# Patient Record
Sex: MALE | Race: WHITE | NOT HISPANIC OR LATINO | Employment: STUDENT | URBAN - METROPOLITAN AREA
[De-identification: names, ages, dates, MRNs, and addresses within clinical notes are randomized per-mention and may not be internally consistent; named-entity substitution may affect disease eponyms.]

---
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
## 2025-02-04 NOTE — NURSING NOTE
Pt appears to slept throughout the night. No distress noted. Safety precaution maintained. Review of Systems Health Update:   What is your biggest concern for today's visit? cpe    GENERAL / CONSTITUTIONAL:  []  YES    [x]  NO   Excessive fatigue.  []  YES    [x]  NO   Unexplained weight loss   [x]  YES    []  NO   Have you traveled outside of the [place] in the past year?   If so, where: [place]    EARS, NOSE, MOUTH AND THROAT:  []  YES    [x]  NO   Hoarseness or voice change.  []  YES    [x]  NO   Difficult or painful swallowing.    HEART:  []  YES    [x]  NO   Chest pain  []  YES    [x]  NO   Palpitation or irregular heart beat.    LUNGS:   []  YES    [x]  NO   Coughing up blood.   []  YES    [x]  NO   Chronic cough.  []  YES    [x]  NO   Wheezing.  []  YES    [x]  NO   Shortness of Breath    INTESTINAL SYSTEM:  []  YES    [x]  NO   Tarry (black) stools.  []  YES    [x]  NO   Recurrent abdominal pain.  []  YES    [x]  NO   Frequent nausea or vomiting.    URINARY SYSTEM:   []  YES    [x]  NO   Difficult or painful urination.  []  YES    [x]  NO   Urination more than once a night.  []  YES    [x]  NO   Bloody Urine    SKELETON AND JOINTS:  []  YES    [x]  NO   Swollen or painful joints.   []  YES    [x]  NO   Gout.   Which joints:     SKIN:  []  YES    [x]  NO   Recurrent skin rash.   []  YES    [x]  NO   Moles that have changed in size or color.    NERVOUS SYSTEM:   []  YES    [x]  NO   Frequent or severe headaches.  []  YES    [x]  NO   Loss of consciousness/concussion.  []  YES    [x]  NO   Weakness or recurrent numbness or tingling in your arms or legs.    PSYCHIATRIC:  Depression Screening  Over the last two weeks, how often have you been bothered by any of the following problems?  []  YES    [x]  NO   Little interest or pleasure in doing things.    []  YES    [x]  NO   Feeling down, depressed or hopeless.   []  YES    [x]  NO   Feeling nervous, anxious or on edge.  []  YES    [x]  NO   Not being able to stop or control worrying.     ENDOCRINE:  []  YES    [x]  NO   History of diabetes.  []  YES    [x]   NO   History of thyroid disease.     HEMATOLOGIC:   []  YES    [x]  NO   Swollen glands or lymph nodes.  []  YES    [x]  NO   History of anemia.   []  YES    [x]  NO   History of blood clots.    IMMUNE SYSTEM:  []  YES    [x]  NO   History of AIDS.  []  YES    [x]  NO   Asthma.    FEMALE HEALTH UPDATE:  []  YES    [x]  NO   Any problems with irregular menstrual periods, painful periods or spotting.  Approximate date of last menstrual period:  N/a   How far apart are your periods:  N/a   How many days do you flow?  N/a   Amount of flow:  Scant []     Moderate  []    Heavy  []   Number of pregnancies:  2  Number of living children:  2  []  YES    [x]  NO   Are you trying to get pregnant?   []  YES    [x]  NO   Do you use birth control (including tubal or partner with vasectomy)?   If yes, what type:    []  YES    [x]  NO   Have you had an abnormal cervical pap smear?  []  YES    [x]  NO   Have you had a hysterectomy?    LIFESTYLE HABITS:    []  YES    [x]  NO   Do you drink alcohol?   Quantity consumed per week on average: Drinks 3  []  YES    [x]  NO   In the past year have you ever drank or used drugs more than you meant to?  []  YES    [x]  NO   Have you felt you wanted or needed to cut down on your drinking or drug use in the past year?  []  YES    [x]  NO   Have you been annoyed by friends or family that suggested you cut back on your drinking or use of drugs?  []  YES    [x]  NO   Have you ever shared hypodermic needles?   []  YES    [x]  NO   Have you used street drugs in the past 2 years?   How many days per week do you exercise for 30 minutes or more: 2-3  []  YES    [x]  NO   Do you smoke?   If you are a former smoker when did you quit? 2014   If you smoke, how many packs per day?    []  YES    [x]  NO   Are you interested in and/or ready to quit smoking?  [x]  YES    []  NO   Do you wear your seatbelt?    []  YES    [x]  NO  []  PAST   Hormonal therapy  []  YES    [x]  NO   Do you have concerns about your  sexual practices that you wish to discuss?  []  YES    [x]  NO   Do you want to be screened for AIDS?     SCREENING FOR INTIMATE PARTNER VIOLENCE:  How often does your partner physically hurt you? 1  How often does your partner insult or talk down to you? 1  How often does your partner threaten you with physical harm? 1  How often does your partner scream at you? 1  [x]  YES    []  NO   Do you currently feel safe in your present living situation?

## 2025-02-13 ENCOUNTER — OFFICE VISIT (OUTPATIENT)
Dept: FAMILY MEDICINE CLINIC | Facility: CLINIC | Age: 20
End: 2025-02-13
Payer: MEDICARE

## 2025-02-13 VITALS
BODY MASS INDEX: 29.92 KG/M2 | OXYGEN SATURATION: 98 % | RESPIRATION RATE: 18 BRPM | DIASTOLIC BLOOD PRESSURE: 88 MMHG | HEIGHT: 66 IN | TEMPERATURE: 98 F | HEART RATE: 100 BPM | SYSTOLIC BLOOD PRESSURE: 130 MMHG | WEIGHT: 186.2 LBS

## 2025-02-13 DIAGNOSIS — F33.2 SEVERE EPISODE OF RECURRENT MAJOR DEPRESSIVE DISORDER, WITHOUT PSYCHOTIC FEATURES (HCC): ICD-10-CM

## 2025-02-13 DIAGNOSIS — Z00.00 ANNUAL PHYSICAL EXAM: Primary | ICD-10-CM

## 2025-02-13 PROCEDURE — 99395 PREV VISIT EST AGE 18-39: CPT | Performed by: PHYSICIAN ASSISTANT

## 2025-02-13 PROCEDURE — 99213 OFFICE O/P EST LOW 20 MIN: CPT | Performed by: PHYSICIAN ASSISTANT

## 2025-02-13 RX ORDER — CHLORAL HYDRATE 500 MG
2000 CAPSULE ORAL DAILY
Qty: 60 CAPSULE | Refills: 6 | Status: SHIPPED | OUTPATIENT
Start: 2025-02-13 | End: 2025-03-15

## 2025-02-13 RX ORDER — QUETIAPINE FUMARATE 50 MG/1
50 TABLET, FILM COATED ORAL
Qty: 30 TABLET | Refills: 6 | Status: SHIPPED | OUTPATIENT
Start: 2025-02-13 | End: 2025-03-15

## 2025-02-13 RX ORDER — ESCITALOPRAM OXALATE 5 MG/1
5 TABLET ORAL DAILY
Qty: 30 TABLET | Refills: 6 | Status: SHIPPED | OUTPATIENT
Start: 2025-02-13 | End: 2025-03-15

## 2025-02-13 NOTE — PROGRESS NOTES
Adult Annual Physical  Name: Brad Mccollum      : 2005      MRN: [id number]  Encounter Provider: Marisa Eisenberg PA-C  Encounter Date: 2025   Encounter department: [facility]    Assessment & Plan  Annual physical exam         Severe episode of recurrent major depressive disorder, without psychotic features (HCC)  Depression Screening Follow-up Plan: Patient's depression screening was positive with a PHQ-9 score of 11. Patient with underlying depression and was advised to continue current medications as prescribed.  Encouraged to schedule with psych, meds as is  Orders:    QUEtiapine (SEROquel) 50 mg tablet; Take 1 tablet (50 mg total) by mouth daily at bedtime    escitalopram (LEXAPRO) 5 mg tablet; Take 1 tablet (5 mg total) by mouth daily    Omega-3 Fatty Acids (fish oil) 1,000 mg; Take 2 capsules (2,000 mg total) by mouth daily    Immunizations and preventive care screenings were discussed with patient today. Appropriate education was printed on patient's after visit summary.    Counseling:  Alcohol/drug use: discussed moderation in alcohol intake, the recommendations for healthy alcohol use, and avoidance of illicit drug use.  Dental Health: discussed importance of regular tooth brushing, flossing, and dental visits.  Injury prevention: discussed safety/seat belts, safety helmets, smoke detectors, carbon monoxide detectors, and smoking near bedding or upholstery.  Exercise: the importance of regular exercise/physical activity was discussed. Recommend exercise 3-5 times per week for at least 30 minutes.          History of Present Illness     Adult Annual Physical:  Patient presents for annual physical.     Diet and Physical Activity:  - Diet/Nutrition: well balanced diet.  - Exercise: no formal exercise.    Depression Screening:    - PHQ-9 Score: 11    General Health:  - Sleep: sleeps poorly.  - Hearing: normal hearing bilateral ears.  - Vision: no  "vision problems.  - Dental: brushes teeth twice daily and no dental visits for > 1 year.     Health:    - Urinary symptoms: none.     Review of Systems   Constitutional: Negative.    HENT: Negative.     Eyes: Negative.    Respiratory: Negative.     Cardiovascular: Negative.    Gastrointestinal: Negative.    Endocrine: Negative.    Genitourinary: Negative.    Musculoskeletal: Negative.    Skin: Negative.    Allergic/Immunologic: Negative.    Neurological: Negative.    Hematological: Negative.    Psychiatric/Behavioral: Negative.       Medical History Reviewed by provider this encounter:  Meds     .    Objective   /88 (Patient Position: Sitting, Cuff Size: Standard)   Pulse 100   Temp 98 °F (36.7 °C) (Temporal)   Resp 18   Ht 5' 6\" (1.676 m)   Wt 84.5 kg (186 lb 3.2 oz)   SpO2 98%   BMI 30.05 kg/m²     Physical Exam  Constitutional:       Appearance: Normal appearance. He is well-developed and normal weight.   HENT:      Head: Normocephalic and atraumatic.      Right Ear: External ear normal.      Left Ear: External ear normal.   Eyes:      Extraocular Movements: Extraocular movements intact.      Conjunctiva/sclera: Conjunctivae normal.      Pupils: Pupils are equal, round, and reactive to light.   Neck:      Thyroid: No thyromegaly.   Cardiovascular:      Rate and Rhythm: Normal rate and regular rhythm.      Pulses: Normal pulses.      Heart sounds: Normal heart sounds. No murmur heard.  Pulmonary:      Effort: Pulmonary effort is normal.      Breath sounds: Normal breath sounds. No wheezing or rales.   Abdominal:      General: Abdomen is flat. Bowel sounds are normal.      Palpations: Abdomen is soft. There is no mass.      Tenderness: There is no abdominal tenderness. There is no rebound.   Musculoskeletal:         General: Normal range of motion.      Cervical back: Normal range of motion and neck supple.   Lymphadenopathy:      Cervical: No cervical adenopathy.   Skin:     General: Skin is warm. "   Neurological:      General: No focal deficit present.      Mental Status: He is alert and oriented to person, place, and time.      Cranial Nerves: No cranial nerve deficit.      Deep Tendon Reflexes: Reflexes normal.   Psychiatric:         Mood and Affect: Mood normal.         Behavior: Behavior normal.         Thought Content: Thought content normal.         Judgment: Judgment normal.

## 2025-02-13 NOTE — ASSESSMENT & PLAN NOTE
Depression Screening Follow-up Plan: Patient's depression screening was positive with a PHQ-9 score of 11. Patient with underlying depression and was advised to continue current medications as prescribed.  Encouraged to schedule with psych, meds as is  Orders:    QUEtiapine (SEROquel) 50 mg tablet; Take 1 tablet (50 mg total) by mouth daily at bedtime    escitalopram (LEXAPRO) 5 mg tablet; Take 1 tablet (5 mg total) by mouth daily    Omega-3 Fatty Acids (fish oil) 1,000 mg; Take 2 capsules (2,000 mg total) by mouth daily

## 2025-03-18 ENCOUNTER — TELEPHONE (OUTPATIENT)
Dept: PSYCHIATRY | Facility: CLINIC | Age: 20
End: 2025-03-18

## 2025-03-18 ENCOUNTER — OFFICE VISIT (OUTPATIENT)
Dept: PSYCHIATRY | Facility: CLINIC | Age: 20
End: 2025-03-18
Payer: MEDICAID

## 2025-03-18 VITALS
BODY MASS INDEX: 29.89 KG/M2 | WEIGHT: 186 LBS | DIASTOLIC BLOOD PRESSURE: 80 MMHG | SYSTOLIC BLOOD PRESSURE: 156 MMHG | HEIGHT: 66 IN

## 2025-03-18 DIAGNOSIS — F90.2 ADHD (ATTENTION DEFICIT HYPERACTIVITY DISORDER), COMBINED TYPE: ICD-10-CM

## 2025-03-18 DIAGNOSIS — F41.1 GAD (GENERALIZED ANXIETY DISORDER): ICD-10-CM

## 2025-03-18 DIAGNOSIS — F33.1 MODERATE EPISODE OF RECURRENT MAJOR DEPRESSIVE DISORDER (HCC): Primary | ICD-10-CM

## 2025-03-18 PROCEDURE — 90792 PSYCH DIAG EVAL W/MED SRVCS: CPT | Performed by: STUDENT IN AN ORGANIZED HEALTH CARE EDUCATION/TRAINING PROGRAM

## 2025-03-18 RX ORDER — QUETIAPINE FUMARATE 50 MG/1
50 TABLET, FILM COATED ORAL
Qty: 30 TABLET | Refills: 0 | Status: SHIPPED | OUTPATIENT
Start: 2025-03-18 | End: 2025-04-17

## 2025-03-18 RX ORDER — ATOMOXETINE 25 MG/1
25 CAPSULE ORAL DAILY
Qty: 30 CAPSULE | Refills: 0 | Status: SHIPPED | OUTPATIENT
Start: 2025-03-18 | End: 2025-04-17

## 2025-03-18 RX ORDER — ESCITALOPRAM OXALATE 5 MG/1
5 TABLET ORAL DAILY
Qty: 30 TABLET | Refills: 0 | Status: SHIPPED | OUTPATIENT
Start: 2025-03-18 | End: 2025-04-17

## 2025-03-18 NOTE — BH TREATMENT PLAN
TREATMENT PLAN (Medication Management Only)        [facility]    Name and Date of Birth:  Brad Mccollum 19 y.o. 2005  MRN: [id number]  Date of Treatment Plan: March 18, 2025  Diagnosis/Diagnoses:    1. Moderate episode of recurrent major depressive disorder (HCC)    2. ADHD (attention deficit hyperactivity disorder), combined type    3. RENE (generalized anxiety disorder)      Strengths/Personal Resources for Self-Care: supportive family, supportive friends, taking medications as prescribed.  Area/Areas of need (in own words): anxiety, depression, ADHD symptoms  1. Long Term Goal:   continue improvement in depression, continue improvement in ADHD symptoms.  Target Date:6 months - 9/18/2025  Person/Persons responsible for completion of goal: Brad  2.  Short Term Objective (s) - How will we reach this goal?:   A.  Provider new recommended medication/dosage changes and/or continue medication(s): continue current medications as prescribed.  B.  N/A.  C.  N/A.  Target Date:6 months - 9/18/2025  Person/Persons Responsible for Completion of Goal: Brad  Progress Towards Goals: starting treatment  Treatment Modality: medication management every 3 months  Review due 180 days from date of this plan: 6 months - 9/18/2025  Expected length of service: maintenance unless revised  My Physician/PA/NP and I have developed this plan together and I agree to work on the goals and objectives. I understand the treatment goals that were developed for my treatment.   Electronic Signatures: on file (unless signed below)    Kleber Lopez MD 03/18/25  
no fever/no chills

## 2025-03-18 NOTE — TELEPHONE ENCOUNTER
[facility] called to verify that patient has been on the escitalopram and quetiapine together in the past. This is the first time that patient is using their pharmacy for the script. Verifed that patient has been taking both medications since 11/2024

## 2025-03-18 NOTE — ASSESSMENT & PLAN NOTE
Orders:    Ambulatory referral to Psych Services    escitalopram (LEXAPRO) 5 mg tablet; Take 1 tablet (5 mg total) by mouth daily    QUEtiapine (SEROquel) 50 mg tablet; Take 1 tablet (50 mg total) by mouth daily at bedtime
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
## 2025-03-19 ENCOUNTER — TELEPHONE (OUTPATIENT)
Age: 20
End: 2025-03-19

## 2025-03-19 NOTE — TELEPHONE ENCOUNTER
----- Message from Kleber Lopez MD sent at 3/18/2025  6:00 PM EDT -----  Regarding: Therapy  Hello,     Could you please add Brad to the therapy list?    Thank you!

## 2025-05-27 DIAGNOSIS — F33.1 MODERATE EPISODE OF RECURRENT MAJOR DEPRESSIVE DISORDER (HCC): ICD-10-CM

## 2025-05-27 DIAGNOSIS — F41.1 GAD (GENERALIZED ANXIETY DISORDER): ICD-10-CM

## 2025-05-28 RX ORDER — ESCITALOPRAM OXALATE 5 MG/1
5 TABLET ORAL DAILY
Qty: 30 TABLET | Refills: 0 | Status: SHIPPED | OUTPATIENT
Start: 2025-05-28

## 2025-05-28 RX ORDER — QUETIAPINE FUMARATE 50 MG/1
50 TABLET, FILM COATED ORAL
Qty: 30 TABLET | Refills: 0 | Status: SHIPPED | OUTPATIENT
Start: 2025-05-28

## 2025-07-18 ENCOUNTER — TELEPHONE (OUTPATIENT)
Age: 20
End: 2025-07-18

## 2025-07-18 NOTE — TELEPHONE ENCOUNTER
Contacted patient in regards to Scheduling TT with JEAN MARIE Vu to contact [phone] for scheduling.    Please send to intake line    Patient can be scheduled for one of the following  Wed 7/23, 1pm & 5pm  Thur 7/24, 4pm  Mon 7/28, 10am, 3pm, 5pm    Paola Bennett  [phone]

## 2025-08-01 DIAGNOSIS — F33.1 MODERATE EPISODE OF RECURRENT MAJOR DEPRESSIVE DISORDER (HCC): ICD-10-CM

## 2025-08-01 RX ORDER — QUETIAPINE FUMARATE 50 MG/1
50 TABLET, FILM COATED ORAL
Qty: 30 TABLET | Refills: 0 | Status: SHIPPED | OUTPATIENT
Start: 2025-08-01